# Patient Record
Sex: FEMALE | Race: WHITE | URBAN - METROPOLITAN AREA
[De-identification: names, ages, dates, MRNs, and addresses within clinical notes are randomized per-mention and may not be internally consistent; named-entity substitution may affect disease eponyms.]

---

## 2017-08-22 ENCOUNTER — HOSPITAL ENCOUNTER (EMERGENCY)
Facility: CLINIC | Age: 37
Discharge: HOME | End: 2017-08-23
Attending: EMERGENCY MEDICINE | Admitting: EMERGENCY MEDICINE
Payer: COMMERCIAL

## 2017-08-22 ENCOUNTER — APPOINTMENT (OUTPATIENT)
Dept: CT IMAGING | Facility: CLINIC | Age: 37
End: 2017-08-22
Attending: EMERGENCY MEDICINE
Payer: COMMERCIAL

## 2017-08-22 DIAGNOSIS — Z98.84 BARIATRIC SURGERY STATUS: ICD-10-CM

## 2017-08-22 DIAGNOSIS — R10.13 ABDOMINAL PAIN, EPIGASTRIC: ICD-10-CM

## 2017-08-22 DIAGNOSIS — R11.2 NON-INTRACTABLE VOMITING WITH NAUSEA, UNSPECIFIED VOMITING TYPE: ICD-10-CM

## 2017-08-22 LAB
ALBUMIN SERPL-MCNC: 4 G/DL (ref 3.4–5)
ALBUMIN UR-MCNC: NEGATIVE MG/DL
ALP SERPL-CCNC: 27 U/L (ref 40–150)
ALT SERPL W P-5'-P-CCNC: 18 U/L (ref 0–50)
AMPHETAMINES UR QL SCN: POSITIVE
ANION GAP SERPL CALCULATED.3IONS-SCNC: 6 MMOL/L (ref 3–14)
APPEARANCE UR: CLEAR
AST SERPL W P-5'-P-CCNC: 11 U/L (ref 0–45)
BARBITURATES UR QL: NEGATIVE
BASOPHILS # BLD AUTO: 0 10E9/L (ref 0–0.2)
BASOPHILS NFR BLD AUTO: 0.2 %
BENZODIAZ UR QL: NEGATIVE
BILIRUB SERPL-MCNC: 0.3 MG/DL (ref 0.2–1.3)
BILIRUB UR QL STRIP: NEGATIVE
BUN SERPL-MCNC: 12 MG/DL (ref 7–30)
CALCIUM SERPL-MCNC: 9 MG/DL (ref 8.5–10.1)
CANNABINOIDS UR QL SCN: NEGATIVE
CHLORIDE SERPL-SCNC: 110 MMOL/L (ref 94–109)
CO2 SERPL-SCNC: 27 MMOL/L (ref 20–32)
COCAINE UR QL: NEGATIVE
COLOR UR AUTO: ABNORMAL
CREAT SERPL-MCNC: 0.65 MG/DL (ref 0.52–1.04)
DIFFERENTIAL METHOD BLD: NORMAL
EOSINOPHIL # BLD AUTO: 0.1 10E9/L (ref 0–0.7)
EOSINOPHIL NFR BLD AUTO: 1.7 %
ERYTHROCYTE [DISTWIDTH] IN BLOOD BY AUTOMATED COUNT: 12.9 % (ref 10–15)
ETHANOL UR QL SCN: NEGATIVE
FERRITIN SERPL-MCNC: 32 NG/ML (ref 12–150)
GFR SERPL CREATININE-BSD FRML MDRD: >90 ML/MIN/1.7M2
GLUCOSE SERPL-MCNC: 82 MG/DL (ref 70–99)
GLUCOSE UR STRIP-MCNC: NEGATIVE MG/DL
HCG UR QL: NEGATIVE
HCT VFR BLD AUTO: 44.5 % (ref 35–47)
HGB BLD-MCNC: 14.8 G/DL (ref 11.7–15.7)
HGB UR QL STRIP: ABNORMAL
IMM GRANULOCYTES # BLD: 0 10E9/L (ref 0–0.4)
IMM GRANULOCYTES NFR BLD: 0.1 %
INR PPP: 1.03 (ref 0.86–1.14)
IRON SATN MFR SERPL: 26 % (ref 15–46)
IRON SERPL-MCNC: 96 UG/DL (ref 35–180)
KETONES UR STRIP-MCNC: NEGATIVE MG/DL
LEUKOCYTE ESTERASE UR QL STRIP: NEGATIVE
LIPASE SERPL-CCNC: 98 U/L (ref 73–393)
LYMPHOCYTES # BLD AUTO: 3.5 10E9/L (ref 0.8–5.3)
LYMPHOCYTES NFR BLD AUTO: 41.5 %
MAGNESIUM SERPL-MCNC: 2.2 MG/DL (ref 1.6–2.3)
MCH RBC QN AUTO: 29.6 PG (ref 26.5–33)
MCHC RBC AUTO-ENTMCNC: 33.3 G/DL (ref 31.5–36.5)
MCV RBC AUTO: 89 FL (ref 78–100)
MONOCYTES # BLD AUTO: 0.5 10E9/L (ref 0–1.3)
MONOCYTES NFR BLD AUTO: 5.8 %
MUCOUS THREADS #/AREA URNS LPF: PRESENT /LPF
NEUTROPHILS # BLD AUTO: 4.2 10E9/L (ref 1.6–8.3)
NEUTROPHILS NFR BLD AUTO: 50.7 %
NITRATE UR QL: NEGATIVE
NRBC # BLD AUTO: 0 10*3/UL
NRBC BLD AUTO-RTO: 0 /100
OPIATES UR QL SCN: NEGATIVE
PH UR STRIP: 7.5 PH (ref 5–7)
PLATELET # BLD AUTO: 264 10E9/L (ref 150–450)
POTASSIUM SERPL-SCNC: 4.3 MMOL/L (ref 3.4–5.3)
PROT SERPL-MCNC: 7.2 G/DL (ref 6.8–8.8)
RBC # BLD AUTO: 5 10E12/L (ref 3.8–5.2)
RBC #/AREA URNS AUTO: <1 /HPF (ref 0–2)
SODIUM SERPL-SCNC: 143 MMOL/L (ref 133–144)
SOURCE: ABNORMAL
SP GR UR STRIP: 1.01 (ref 1–1.03)
SQUAMOUS #/AREA URNS AUTO: 3 /HPF (ref 0–1)
TIBC SERPL-MCNC: 362 UG/DL (ref 240–430)
TSH SERPL DL<=0.005 MIU/L-ACNC: 0.86 MU/L (ref 0.4–4)
UROBILINOGEN UR STRIP-MCNC: NORMAL MG/DL (ref 0–2)
WBC # BLD AUTO: 8.3 10E9/L (ref 4–11)
WBC #/AREA URNS AUTO: <1 /HPF (ref 0–2)

## 2017-08-22 PROCEDURE — 96361 HYDRATE IV INFUSION ADD-ON: CPT | Performed by: EMERGENCY MEDICINE

## 2017-08-22 PROCEDURE — 85610 PROTHROMBIN TIME: CPT | Performed by: EMERGENCY MEDICINE

## 2017-08-22 PROCEDURE — 25000125 ZZHC RX 250: Performed by: RADIOLOGY

## 2017-08-22 PROCEDURE — 25000128 H RX IP 250 OP 636: Performed by: RADIOLOGY

## 2017-08-22 PROCEDURE — 84443 ASSAY THYROID STIM HORMONE: CPT | Performed by: EMERGENCY MEDICINE

## 2017-08-22 PROCEDURE — 80053 COMPREHEN METABOLIC PANEL: CPT | Performed by: EMERGENCY MEDICINE

## 2017-08-22 PROCEDURE — 83735 ASSAY OF MAGNESIUM: CPT | Performed by: EMERGENCY MEDICINE

## 2017-08-22 PROCEDURE — 85025 COMPLETE CBC W/AUTO DIFF WBC: CPT | Performed by: EMERGENCY MEDICINE

## 2017-08-22 PROCEDURE — 81025 URINE PREGNANCY TEST: CPT | Performed by: EMERGENCY MEDICINE

## 2017-08-22 PROCEDURE — 81001 URINALYSIS AUTO W/SCOPE: CPT | Performed by: EMERGENCY MEDICINE

## 2017-08-22 PROCEDURE — 80307 DRUG TEST PRSMV CHEM ANLYZR: CPT | Performed by: EMERGENCY MEDICINE

## 2017-08-22 PROCEDURE — 99284 EMERGENCY DEPT VISIT MOD MDM: CPT | Mod: Z6 | Performed by: EMERGENCY MEDICINE

## 2017-08-22 PROCEDURE — 80320 DRUG SCREEN QUANTALCOHOLS: CPT | Performed by: EMERGENCY MEDICINE

## 2017-08-22 PROCEDURE — 83540 ASSAY OF IRON: CPT | Performed by: EMERGENCY MEDICINE

## 2017-08-22 PROCEDURE — 82728 ASSAY OF FERRITIN: CPT | Performed by: EMERGENCY MEDICINE

## 2017-08-22 PROCEDURE — 96360 HYDRATION IV INFUSION INIT: CPT | Performed by: EMERGENCY MEDICINE

## 2017-08-22 PROCEDURE — 83550 IRON BINDING TEST: CPT | Performed by: EMERGENCY MEDICINE

## 2017-08-22 PROCEDURE — 74177 CT ABD & PELVIS W/CONTRAST: CPT

## 2017-08-22 PROCEDURE — 83690 ASSAY OF LIPASE: CPT | Performed by: EMERGENCY MEDICINE

## 2017-08-22 PROCEDURE — 99284 EMERGENCY DEPT VISIT MOD MDM: CPT | Mod: 25 | Performed by: EMERGENCY MEDICINE

## 2017-08-22 PROCEDURE — 25000128 H RX IP 250 OP 636: Performed by: EMERGENCY MEDICINE

## 2017-08-22 RX ORDER — SODIUM CHLORIDE 9 MG/ML
1000 INJECTION, SOLUTION INTRAVENOUS CONTINUOUS
Status: DISCONTINUED | OUTPATIENT
Start: 2017-08-22 | End: 2017-08-23 | Stop reason: HOSPADM

## 2017-08-22 RX ORDER — MULTIVITAMIN,THERAPEUTIC
1 TABLET ORAL DAILY
COMMUNITY

## 2017-08-22 RX ORDER — IOPAMIDOL 755 MG/ML
68 INJECTION, SOLUTION INTRAVASCULAR ONCE
Status: COMPLETED | OUTPATIENT
Start: 2017-08-22 | End: 2017-08-22

## 2017-08-22 RX ORDER — BUPRENORPHINE AND NALOXONE 4; 1 MG/1; MG/1
1 FILM, SOLUBLE BUCCAL; SUBLINGUAL DAILY
COMMUNITY

## 2017-08-22 RX ADMIN — IOPAMIDOL 68 ML: 755 INJECTION, SOLUTION INTRAVENOUS at 22:38

## 2017-08-22 RX ADMIN — SODIUM CHLORIDE 1000 ML: 900 INJECTION, SOLUTION INTRAVENOUS at 20:10

## 2017-08-22 RX ADMIN — SODIUM CHLORIDE, PRESERVATIVE FREE 67 ML: 5 INJECTION INTRAVENOUS at 22:38

## 2017-08-22 NOTE — ED AVS SNAPSHOT
[facility], [place], Emergency Department    [address] [place] [address]    Phone:  [phone]                                       Poly Choi   MRN: [id number]    Department:  [facility], Emergency Department   Date of Visit:  8/22/2017           After Visit Summary Signature Page     I have received my discharge instructions, and my questions have been answered. I have discussed any challenges I see with this plan with the nurse or doctor.    ..........................................................................................................................................  Patient/Patient Representative Signature      ..........................................................................................................................................  Patient Representative Print Name and Relationship to Patient    ..................................................               ................................................  Date                                            Time    ..........................................................................................................................................  Reviewed by Signature/Title    ...................................................              ..............................................  Date                                                            Time

## 2017-08-22 NOTE — ED NOTES
Pt with hx of gastric sleeve surgery 2 yrs ago presents with rapid weight loss of about 14lbs in 3 weeks, decreased appetite, nausea, dizziness and weakness. She was seen at [facility] ER a day or so ago for same symptoms.

## 2017-08-22 NOTE — ED AVS SNAPSHOT
[facility], Emergency Department    [address] [address]    Phone:  [phone]                                       Poly Choi   MRN: [id number]    Department:  [facility], Emergency Department   Date of Visit:  8/22/2017           Patient Information     Date Of Birth          1980        Your diagnoses for this visit were:     Non-intractable vomiting with nausea, unspecified vomiting type        You were seen by Boyd Parisi MD.        Discharge Instructions       Please make an appointment to follow up with Your Primary Care Provider in the next week for recheck.    Please follow up with Surgery (Bariatric) (phone: [phone]) as needed for continued problems.    Discharge References/Attachments     VOMITING (6Y-ADULT) (ENGLISH)      24 Hour Appointment Hotline       To make an appointment at any [facility] clinic, call [phone] ([phone]). If you don't have a family doctor or clinic, we will help you find one. [facility] clinics are conveniently located to serve the needs of you and your family.             Review of your medicines      START taking        Dose / Directions Last dose taken    lactulose 20 GM/30ML Soln   Dose:  30 mL   Quantity:  450 mL        Take 30 mLs by mouth 3 times daily for 5 days   Refills:  0          CONTINUE these medicines which may have CHANGED, or have new prescriptions. If we are uncertain of the size of tablets/capsules you have at home, strength may be listed as something that might have changed.        Dose / Directions Last dose taken    ondansetron 4 MG ODT tab   Commonly known as:  ZOFRAN ODT   Dose:  4 mg   What changed:  reasons to take this   Quantity:  10 tablet        Take 1 tablet (4 mg) by mouth every 6 hours as needed   Refills:  0          Our records show that you are taking the medicines listed below. If these are incorrect, please call your family doctor or clinic.        Dose / Directions Last dose  taken    buprenorphine HCl-naloxone HCl 4-1 MG per film   Commonly known as:  SUBOXONE   Dose:  1 Film        Place 1 Film under the tongue daily   Refills:  0        BUSPIRONE HCL PO   Dose:  30 mg        Take 30 mg by mouth 2 times daily   Refills:  0        LEVOTHYROXINE SODIUM PO   Dose:  25 mcg        Take 25 mcg by mouth daily   Refills:  0        multivitamin, therapeutic Tabs tablet   Dose:  1 tablet        Take 1 tablet by mouth daily   Refills:  0        SPIRONOLACTONE PO   Dose:  50 mg        Take 50 mg by mouth daily   Refills:  0                Prescriptions were sent or printed at these locations (2 Prescriptions)                   Other Prescriptions                Printed at Department/Unit printer (2 of 2)         lactulose 20 GM/30ML SOLN               ondansetron (ZOFRAN ODT) 4 MG ODT tab                Procedures and tests performed during your visit     CBC with platelets differential    CT Abdomen Pelvis w Contrast    Comprehensive metabolic panel    Drug abuse screen 6 urine (tox)    Ferritin    HCG qualitative urine    INR    Iron and iron binding capacity    Lipase    Magnesium    Peripheral IV: Standard    Routine UA with microscopic    TSH with free T4 reflex      Orders Needing Specimen Collection     None      Pending Results     Date and Time Order Name Status Description    8/22/2017 2122 CT Abdomen Pelvis w Contrast Preliminary             Pending Culture Results     No orders found for last 3 day(s).            Pending Results Instructions     If you had any lab results that were not finalized at the time of your Discharge, you can call the ED Lab Result RN at [phone]. You will be contacted by this team for any positive Lab results or changes in treatment. The nurses are available 7 days a week from 10A to 6:30P.  You can leave a message 24 hours per day and they will return your call.        Thank you for choosing [facility]       Thank you for choosing [facility] for your care.  "Our goal is always to provide you with excellent care. Hearing back from our patients is one way we can continue to improve our services. Please take a few minutes to complete the written survey that you may receive in the mail after you visit with us. Thank you!        Floq Information     Floq lets you send messages to your doctor, view your test results, renew your prescriptions, schedule appointments and more. To sign up, go to [email or web address] . Click on \"Log in\" on the left side of the screen, which will take you to the Welcome page. Then click on \"Sign up Now\" on the right side of the page.     You will be asked to enter the access code listed below, as well as some personal information. Please follow the directions to create your username and password.     Your access code is: [email or web address]  Expires: 2017 12:24 AM     Your access code will  in 90 days. If you need help or a new code, please call your [facility] clinic or [phone].        Care EveryWhere ID     This is your Care EveryWhere ID. This could be used by other organizations to access your [facility] medical records  [id number]        Equal Access to Services     EDIS ABBASI AH: Hadii charles Lebron, wadivya matute, qanewton kaalmada sandra, pee gaviria. So [facility] [phone].    ATENCIÓN: Si habla español, tiene a suh disposición servicios gratuitos de asistencia lingüística. Llame al [phone].    We comply with applicable federal civil rights laws and [place] laws. We do not discriminate on the basis of race, color, national origin, age, disability sex, sexual orientation or gender identity.            After Visit Summary       This is your record. Keep this with you and show to your community pharmacist(s) and doctor(s) at your next visit.                  "

## 2017-08-23 VITALS
TEMPERATURE: 98.2 F | DIASTOLIC BLOOD PRESSURE: 50 MMHG | HEART RATE: 88 BPM | HEIGHT: 64 IN | SYSTOLIC BLOOD PRESSURE: 91 MMHG | BODY MASS INDEX: 18.95 KG/M2 | WEIGHT: 111 LBS | RESPIRATION RATE: 17 BRPM | OXYGEN SATURATION: 100 %

## 2017-08-23 RX ORDER — ONDANSETRON 4 MG/1
4 TABLET, ORALLY DISINTEGRATING ORAL EVERY 6 HOURS PRN
Qty: 10 TABLET | Refills: 0 | Status: SHIPPED | OUTPATIENT
Start: 2017-08-23 | End: 2017-08-26

## 2017-08-23 RX ORDER — LACTULOSE 20 G/30ML
30 SOLUTION ORAL 3 TIMES DAILY
Qty: 450 ML | Refills: 0 | Status: SHIPPED | OUTPATIENT
Start: 2017-08-23 | End: 2017-08-28

## 2017-08-23 NOTE — ED PROVIDER NOTES
History     Chief Complaint   Patient presents with     Weight Loss     x 3 weeks     Dizziness     Generalized Weakness     HPI  Poly Choi is a 37-year-old female who presents to emergency department with complaints of a 15 pound weight loss over the last 3-4 weeks. Patient states that she had gastric bypass surgery done in [place] in 2015. Patient states that recently she s noticed that she has been losing weight and states that she has been increasing her calorie input in order to gain weight. Patient denies any diarrhea, but states she has had some occasional emesis with upper abdominal distress.  Patient states she was seen at [facility], 3 days ago where she had an evaluation done with laboratory testing and abdominal x-ray imaging. Patient was sent home with instructions for constipation after a pretty much negative evaluation. The patient states that she s had no coughing, no fevers, no UTI symptoms and states that she is on Suboxone chronically.  Patient presents here to the ER for evaluation.      This part of the document was transcribed by James Gunter, Medical Scribe.    I have reviewed the Medications, Allergies, Past Medical and Surgical History, and Social History in the [facility] system.    Past Medical History:   Diagnosis Date     Allergic state      Anxiety        No current facility-administered medications on file prior to encounter.   No current outpatient prescriptions on file prior to encounter.  Allergies   Allergen Reactions     Epinephrine Other (See Comments)     Social History     Social History     Marital status:      Spouse name: N/A     Number of children: N/A     Years of education: N/A     Occupational History     Not on file.     Social History Main Topics     Smoking status: Current Every Day Smoker     Packs/day: 0.25     Smokeless tobacco: Never Used     Alcohol use No     Drug use: No     Sexual activity: Not on file     Other Topics Concern     Not on file  "    Social History Narrative     No narrative on file     Past Surgical History:   Procedure Laterality Date     CHOLECYSTECTOMY       COLONOSCOPY       DAVINCI GASTRIC SLEEVE       ENT SURGERY       GYN SURGERY       HERNIA REPAIR       ORTHOPEDIC SURGERY       No family history on file.    Review of Systems   All other systems reviewed and are negative.      Physical Exam   BP: 99/57  Pulse: 88  Temp: 98.2  F (36.8  C)  Resp: 16  Height: 161.9 cm (5' 3.75\")  Weight: 50.3 kg (111 lb)  SpO2: 100 %  Physical Exam   Constitutional: She is oriented to person, place, and time.   Alert and conversant and pleasant   HENT:   Head: Atraumatic.   Eyes: EOM are normal. Pupils are equal, round, and reactive to light.   Neck: Neck supple.   Cardiovascular: Normal heart sounds.    Pulmonary/Chest: Breath sounds normal.   Abdominal: Soft. There is tenderness (mild epigastric). There is no rebound and no guarding.   Increased bowel sounds   Musculoskeletal: She exhibits no edema or tenderness.   Neurological: She is alert and oriented to person, place, and time.   Grossly intact and symmetric   Skin: Skin is warm.   Psychiatric: She has a normal mood and affect.       ED Course     ED Course     Procedures        Results for orders placed or performed during the hospital encounter of 08/22/17   CT Abdomen Pelvis w Contrast    Narrative    1. Postoperative changes of sleeve gastrectomy without any acute  findings in the abdomen.  2. Cholecystectomy mild intrahepatic and extra hepatic biliary ductal  dilatation at, likely within normal postoperative limits.  3. Redundant and stool-filled colon.  4. Small hiatal hernia.     Routine UA with microscopic   Result Value Ref Range    Color Urine Light Yellow     Appearance Urine Clear     Glucose Urine Negative NEG^Negative mg/dL    Bilirubin Urine Negative NEG^Negative    Ketones Urine Negative NEG^Negative mg/dL    Specific Gravity Urine 1.008 1.003 - 1.035    Blood Urine Trace (A) " NEG^Negative    pH Urine 7.5 (H) 5.0 - 7.0 pH    Protein Albumin Urine Negative NEG^Negative mg/dL    Urobilinogen mg/dL Normal 0.0 - 2.0 mg/dL    Nitrite Urine Negative NEG^Negative    Leukocyte Esterase Urine Negative NEG^Negative    Source Midstream Urine     WBC Urine <1 0 - 2 /HPF    RBC Urine <1 0 - 2 /HPF    Squamous Epithelial /HPF Urine 3 (H) 0 - 1 /HPF    Mucous Urine Present (A) NEG^Negative /LPF   Comprehensive metabolic panel   Result Value Ref Range    Sodium 143 133 - 144 mmol/L    Potassium 4.3 3.4 - 5.3 mmol/L    Chloride 110 (H) 94 - 109 mmol/L    Carbon Dioxide 27 20 - 32 mmol/L    Anion Gap 6 3 - 14 mmol/L    Glucose 82 70 - 99 mg/dL    Urea Nitrogen 12 7 - 30 mg/dL    Creatinine 0.65 0.52 - 1.04 mg/dL    GFR Estimate >90 >60 mL/min/1.7m2    GFR Estimate If Black >90 >60 mL/min/1.7m2    Calcium 9.0 8.5 - 10.1 mg/dL    Bilirubin Total 0.3 0.2 - 1.3 mg/dL    Albumin 4.0 3.4 - 5.0 g/dL    Protein Total 7.2 6.8 - 8.8 g/dL    Alkaline Phosphatase 27 (L) 40 - 150 U/L    ALT 18 0 - 50 U/L    AST 11 0 - 45 U/L   Lipase   Result Value Ref Range    Lipase 98 73 - 393 U/L   CBC with platelets differential   Result Value Ref Range    WBC 8.3 4.0 - 11.0 10e9/L    RBC Count 5.00 3.8 - 5.2 10e12/L    Hemoglobin 14.8 11.7 - 15.7 g/dL    Hematocrit 44.5 35.0 - 47.0 %    MCV 89 78 - 100 fl    MCH 29.6 26.5 - 33.0 pg    MCHC 33.3 31.5 - 36.5 g/dL    RDW 12.9 10.0 - 15.0 %    Platelet Count 264 150 - 450 10e9/L    Diff Method Automated Method     % Neutrophils 50.7 %    % Lymphocytes 41.5 %    % Monocytes 5.8 %    % Eosinophils 1.7 %    % Basophils 0.2 %    % Immature Granulocytes 0.1 %    Nucleated RBCs 0 0 /100    Absolute Neutrophil 4.2 1.6 - 8.3 10e9/L    Absolute Lymphocytes 3.5 0.8 - 5.3 10e9/L    Absolute Monocytes 0.5 0.0 - 1.3 10e9/L    Absolute Eosinophils 0.1 0.0 - 0.7 10e9/L    Absolute Basophils 0.0 0.0 - 0.2 10e9/L    Abs Immature Granulocytes 0.0 0 - 0.4 10e9/L    Absolute Nucleated RBC 0.0    INR    Result Value Ref Range    INR 1.03 0.86 - 1.14   HCG qualitative urine   Result Value Ref Range    HCG Qual Urine Negative NEG^Negative   Ferritin   Result Value Ref Range    Ferritin 32 12 - 150 ng/mL   Iron and iron binding capacity   Result Value Ref Range    Iron 96 35 - 180 ug/dL    Iron Binding Cap 362 240 - 430 ug/dL    Iron Saturation Index 26 15 - 46 %   Magnesium   Result Value Ref Range    Magnesium 2.2 1.6 - 2.3 mg/dL   Drug abuse screen 6 urine (tox)   Result Value Ref Range    Amphetamine Qual Urine Positive (A) NEG^Negative    Barbiturates Qual Urine Negative NEG^Negative    Benzodiazepine Qual Urine Negative NEG^Negative    Cannabinoids Qual Urine Negative NEG^Negative    Cocaine Qual Urine Negative NEG^Negative    Ethanol Qual Urine Negative NEG^Negative    Opiates Qualitative Urine Negative NEG^Negative   TSH with free T4 reflex   Result Value Ref Range    TSH 0.86 0.40 - 4.00 mU/L       Labs Ordered and Resulted from Time of ED Arrival Up to the Time of Departure from the ED   ROUTINE UA WITH MICROSCOPIC - Abnormal; Notable for the following:        Result Value    Blood Urine Trace (*)     pH Urine 7.5 (*)     Squamous Epithelial /HPF Urine 3 (*)     Mucous Urine Present (*)     All other components within normal limits   COMPREHENSIVE METABOLIC PANEL - Abnormal; Notable for the following:     Chloride 110 (*)     Alkaline Phosphatase 27 (*)     All other components within normal limits   DRUG ABUSE SCREEN 6 CHEM DEP URINE ([facility]) - Abnormal; Notable for the following:     Amphetamine Qual Urine Positive (*)     All other components within normal limits   LIPASE   CBC WITH PLATELETS DIFFERENTIAL   INR   HCG QUALITATIVE URINE   FERRITIN   IRON AND IRON BINDING CAPACITY   MAGNESIUM   TSH WITH FREE T4 REFLEX   PERIPHERAL IV CATHETER         Assessments & Plan (with Medical Decision Making)     I have reviewed the nursing notes.    Medications   0.9% sodium chloride BOLUS (0 mLs Intravenous Stopped  8/22/17 2201)     Followed by   0.9% sodium chloride infusion (not administered)   sodium chloride 0.9 % for CT scan flush dose 67 mL (67 mLs As instructed Given 8/22/17 2238)   iohexol (OMNIPAQUE) solution 25 mL (25 mLs Oral Given 8/22/17 2213)   iopamidol (ISOVUE-370) solution 68 mL (68 mLs Intravenous Given 8/22/17 2238)     Patient's workup was relatively unremarkable except for a large stool burden on her CT and amphetamine in her urine.  Patient at this time will be sent home.    I have reviewed the findings, diagnosis, plan and need for follow up with the patient.    New Prescriptions    LACTULOSE 20 GM/30ML SOLN    Take 30 mLs by mouth 3 times daily for 5 days    ONDANSETRON (ZOFRAN ODT) 4 MG ODT TAB    Take 1 tablet (4 mg) by mouth every 6 hours as needed       Final diagnoses:   Non-intractable vomiting with nausea, unspecified vomiting type     Please make an appointment to follow up with Your Primary Care Provider in the next week for recheck.    Please follow up with Surgery (Bariatric) (phone: [phone]) as needed for continued problems.    Routine discharge instructions were given for this diagnosis    Boyd Parisi MD    8/22/2017   [facility], [place], EMERGENCY DEPARTMENT     Boyd Parisi MD  08/23/17 0027

## 2018-01-24 ENCOUNTER — OFFICE VISIT (OUTPATIENT)
Dept: URGENT CARE | Facility: URGENT CARE | Age: 38
End: 2018-01-24
Payer: MEDICAID

## 2018-01-24 VITALS
WEIGHT: 112 LBS | TEMPERATURE: 99 F | OXYGEN SATURATION: 97 % | RESPIRATION RATE: 16 BRPM | HEART RATE: 86 BPM | DIASTOLIC BLOOD PRESSURE: 62 MMHG | SYSTOLIC BLOOD PRESSURE: 120 MMHG

## 2018-01-24 DIAGNOSIS — N94.9 VAGINAL SYMPTOM: Primary | ICD-10-CM

## 2018-01-24 DIAGNOSIS — R10.2 PELVIC PAIN IN FEMALE: ICD-10-CM

## 2018-01-24 PROBLEM — R41.840 ATTENTION DEFICIT: Status: ACTIVE | Noted: 2017-01-02

## 2018-01-24 PROBLEM — E03.9 HYPOTHYROIDISM: Chronic | Status: ACTIVE | Noted: 2018-01-24

## 2018-01-24 LAB
ALBUMIN UR-MCNC: NEGATIVE MG/DL
APPEARANCE UR: CLEAR
BETA HCG QUAL IFA URINE: NEGATIVE
BILIRUB UR QL STRIP: NEGATIVE
COLOR UR AUTO: YELLOW
GLUCOSE UR STRIP-MCNC: NEGATIVE MG/DL
HGB UR QL STRIP: NEGATIVE
KETONES UR STRIP-MCNC: NEGATIVE MG/DL
LEUKOCYTE ESTERASE UR QL STRIP: NEGATIVE
NITRATE UR QL: NEGATIVE
PH UR STRIP: 5.5 PH (ref 5–7)
SOURCE: NORMAL
SP GR UR STRIP: >1.03 (ref 1–1.03)
SPECIMEN SOURCE: NORMAL
UROBILINOGEN UR STRIP-ACNC: 0.2 EU/DL (ref 0.2–1)
WET PREP SPEC: NORMAL

## 2018-01-24 PROCEDURE — 87591 N.GONORRHOEAE DNA AMP PROB: CPT | Performed by: FAMILY MEDICINE

## 2018-01-24 PROCEDURE — 87491 CHLMYD TRACH DNA AMP PROBE: CPT | Performed by: FAMILY MEDICINE

## 2018-01-24 PROCEDURE — 87210 SMEAR WET MOUNT SALINE/INK: CPT | Performed by: FAMILY MEDICINE

## 2018-01-24 PROCEDURE — 99203 OFFICE O/P NEW LOW 30 MIN: CPT | Performed by: FAMILY MEDICINE

## 2018-01-24 PROCEDURE — 84703 CHORIONIC GONADOTROPIN ASSAY: CPT | Performed by: FAMILY MEDICINE

## 2018-01-24 PROCEDURE — 81003 URINALYSIS AUTO W/O SCOPE: CPT | Performed by: FAMILY MEDICINE

## 2018-01-24 RX ORDER — ESCITALOPRAM OXALATE 10 MG/1
10 TABLET ORAL
COMMUNITY
Start: 2017-12-08

## 2018-01-24 RX ORDER — ONDANSETRON 4 MG/1
4-8 TABLET, ORALLY DISINTEGRATING ORAL EVERY 8 HOURS PRN
Qty: 20 TABLET | Refills: 1 | COMMUNITY
Start: 2018-01-24

## 2018-01-24 RX ORDER — DEXTROAMPHETAMINE SACCHARATE, AMPHETAMINE ASPARTATE, DEXTROAMPHETAMINE SULFATE AND AMPHETAMINE SULFATE 2.5; 2.5; 2.5; 2.5 MG/1; MG/1; MG/1; MG/1
10 TABLET ORAL
COMMUNITY
Start: 2018-01-12 | End: 2018-02-11

## 2018-01-24 RX ORDER — BUPRENORPHINE AND NALOXONE 2; .5 MG/1; MG/1
1 FILM, SOLUBLE BUCCAL; SUBLINGUAL DAILY
COMMUNITY
Start: 2018-01-24

## 2018-01-24 RX ORDER — LEVOTHYROXINE SODIUM 25 UG/1
TABLET ORAL
COMMUNITY
Start: 2017-07-17

## 2018-01-24 NOTE — MR AVS SNAPSHOT
After Visit Summary   1/24/2018    Poly Obando    MRN: [id number]           Patient Information     Date Of Birth          1980        Visit Information        Provider Department      1/24/2018 7:30 PM Sarita Daniels MD [facility]        Today's Diagnoses     Vaginal symptom    -  1    Pelvic pain in female          Care Instructions    Please call the Imaging Center to schedule an appointment for your imaging. [phone]    Pelvic Pain, Uncertain Cause    Pelvic pain is pain felt in the lowest part of the belly (abdomen) and between the hipbones. The pain may be acute. This means it occurred suddenly and recently. Or the pain may be chronic. This means it has occurred for 6 months or longer.  There are many possible causes of pelvic pain. The pain may be due to a problem in the female reproductive system (pictured here). Or, it may be due to a problem in the digestive, urinary, or musculoskeletal systems.  Based on your visit today, the exact cause of your pelvic pain is not certain. Your condition does not appear to be serious at this time. But it is important for you to keep watching for any new symptoms or worsening of your condition.  General care  Your healthcare provider may advise a number of ways to help manage your pain. These can include:    Taking over-the-counter pain medicine. Stronger pain medicine may also be prescribed, if needed.    Applying heat to the pelvic area. Use a heating pad or a hot pack. Taking a hot bath may also help.    Getting plenty of rest.    Making certain lifestyle changes. These can include practicing good posture and getting regular exercise. (Studies have shown that these changes help reduce pelvic pain in some women.)    Seeing a physical therapist or pain specialist. These healthcare providers can discuss other ways to manage pain with you.  Follow-up care  Follow up with your healthcare provider, or as advised.   When to seek  "medical advice  Call your healthcare provider right away if any of the following occur:    Fever of 100.4 F or higher, or as directed by your healthcare provider    Pain worsens or you have sudden, severe pain or new pain    Nausea, vomiting, sweating, or restlessness    Dizziness or fainting    Unusual vaginal discharge    Abnormal vaginal bleeding (especially bleeding after menopause)  Date Last Reviewed: 6/11/2015 2000-2017 [employer]. [address] [place] [address]. All rights reserved. This information is not intended as a substitute for professional medical care. Always follow your healthcare professional's instructions.                Follow-ups after your visit        Future tests that were ordered for you today     Open Future Orders        Priority Expected Expires Ordered    US Pelvic Complete w Transvaginal Routine  1/24/2019 1/24/2018            Who to contact     If you have questions or need follow up information about today's clinic visit or your schedule please contact [facility] directly at [phone].  Normal or non-critical lab and imaging results will be communicated to you by Bravoflyhart, letter or phone within 4 business days after the clinic has received the results. If you do not hear from us within 7 days, please contact the clinic through Bravoflyhart or phone. If you have a critical or abnormal lab result, we will notify you by phone as soon as possible.  Submit refill requests through [employer] or call your pharmacy and they will forward the refill request to us. Please allow 3 business days for your refill to be completed.          Additional Information About Your Visit        Bravoflyhart Information     [employer] lets you send messages to your doctor, view your test results, renew your prescriptions, schedule appointments and more. To sign up, go to [email or web address] . Click on \"Log in\" on the left side of the screen, which will take you to the " "Welcome page. Then click on \"Sign up Now\" on the right side of the page.     You will be asked to enter the access code listed below, as well as some personal information. Please follow the directions to create your username and password.     Your access code is: [email or web address]  Expires: 2018  9:08 PM     Your access code will  in 90 days. If you need help or a new code, please call your [facility] clinic or [phone].        Care EveryWhere ID     This is your Care EveryWhere ID. This could be used by other organizations to access your [facility] medical records  [id number]        Your Vitals Were     Pulse Temperature Respirations Pulse Oximetry Breastfeeding?       86 99  F (37.2  C) (Oral) 16 97% No        Blood Pressure from Last 3 Encounters:   18 120/62    Weight from Last 3 Encounters:   18 112 lb (50.8 kg)              We Performed the Following     *UA reflex to Microscopic and Culture ([facility] [facility] and [facility] (except [place] and [place])     Beta HCG qual IFA urine - [facility] and [facility]     Beta HCG qual IFA urine - [facility] and [place]     CHLAMYDIA TRACHOMATIS PCR     NEISSERIA GONORRHOEA PCR     Wet prep        Primary Care Provider Office Phone # Fax #    [facility] [phone] [address] [facility] [address]        Equal Access to Services     EDIS ABBASI : Hadii aad ku hadasho Soomaali, waaxda luqadaha, qaybta kaalmada adebahmanda, pee lindsay . So [facility] [phone].    ATENCIÓN: Si habla español, tiene a suh disposición servicios gratuitos de asistencia lingüística. Jessika al [phone].    We comply with applicable federal civil rights laws and [place] laws. We do not discriminate on the basis of race, color, national origin, age, disability, sex, sexual orientation, or gender identity.            Thank you!     Thank you for choosing [facility]  for your care. Our goal is always to " provide you with excellent care. Hearing back from our patients is one way we can continue to improve our services. Please take a few minutes to complete the written survey that you may receive in the mail after your visit with us. Thank you!             Your Updated Medication List - Protect others around you: Learn how to safely use, store and throw away your medicines at www.disposemymeds.org.          This list is accurate as of 1/24/18  9:08 PM.  Always use your most recent med list.                   Brand Name Dispense Instructions for use Diagnosis    amphetamine-dextroamphetamine 10 MG per tablet    ADDERALL     Take 10 mg by mouth        escitalopram 10 MG tablet    LEXAPRO     Take 10 mg by mouth        levothyroxine 25 MCG tablet    SYNTHROID/LEVOTHROID     TAKE ONE TABLET BY MOUTH ONCE DAILY        SUBOXONE 2-0.5 MG per film   Generic drug:  buprenorphine HCl-naloxone HCl      Place 1 Film under the tongue daily        ZOFRAN ODT 4 MG ODT tab   Generic drug:  ondansetron     20 tablet    Take 1-2 tablets (4-8 mg) by mouth every 8 hours as needed for nausea

## 2018-01-25 NOTE — PROGRESS NOTES
Cc: vaginal symptoms    Patient currently  from her   A month and a half ago unprotected sex with another person    Since then has been having some pelvic discomfort  Then she started having spotting just for a couple of days then 3 days later got her period.   After this the pelvic discomfort   No loss of control of bowel or bladder, no numbness or weakness down the legs  No thoughts of harming self or others     Also noticed some change in her vaginal discharge     Patient smokes    She feels safe at home. No thoughts of harming self or others. Denies any concern or history of domestic violence or spousal or partner abuse.     I had recommended complete panel  STD testing: YES and patient declined    Dysuria urgency or frequency or UTI symptoms: No     Problem list and histories reviewed & adjusted, as indicated.  Additional history: as documented    Problem list, Medication list, Allergies, and Medical/Social/Surgical histories reviewed in [facility] and updated as appropriate.    ROS:  Constitutional, HEENT, cardiovascular, pulmonary, gi and gu systems are negative, except as otherwise noted.    OBJECTIVE:                                                    /62  Pulse 86  Temp 99  F (37.2  C) (Oral)  Resp 16  Wt 112 lb (50.8 kg)  SpO2 97%  Breastfeeding? No  There is no height or weight on file to calculate BMI.  GENERAL: healthy, alert and no distress  EYES: Eyes grossly normal to inspection, conjunctivae and sclerae normal  NECK: no adenopathy, no asymmetry, masses, or scars and thyroid normal to palpation  RESP: lungs clear to auscultation - no rales, rhonchi or wheezes  CV: regular rate and rhythm, normal S1 S2, no S3 or S4, no murmur, click or rub, no peripheral edema and peripheral pulses strong  ABDOMEN: soft, nontender, no hepatosplenomegaly, no masses and bowel sounds normal   (female): female external genitalia appeared normal , normal urethral meatus, vaginal mucosa, normal  cervix/adnexa/uterus without masses, No cervical motion tenderness  Ovaries palpable bilaterally, some tenderness and fullness on left adnexal area   Vaginal discharge appeared: normal   MS: no gross musculoskeletal defects noted, no edema  SKIN: no suspicious lesions or rashes  NEURO: Normal strength and tone, mentation intact and speech normal  PSYCH: mentation appears normal, affect normal/bright no thoughts of harming self or others     Diagnostic Test Results:  Results for orders placed or performed in visit on 01/24/18 (from the past 24 hour(s))   Wet prep   Result Value Ref Range    Specimen Description Vagina     Wet Prep No yeast seen     Wet Prep No clue cells seen     Wet Prep No Trichomonas seen    *UA reflex to Microscopic and Culture ([facility] (except [place] and [place])   Result Value Ref Range    Color Urine Yellow     Appearance Urine Clear     Glucose Urine Negative NEG^Negative mg/dL    Bilirubin Urine Negative NEG^Negative    Ketones Urine Negative NEG^Negative mg/dL    Specific Gravity Urine >1.030 1.003 - 1.035    Blood Urine Negative NEG^Negative    pH Urine 5.5 5.0 - 7.0 pH    Protein Albumin Urine Negative NEG^Negative mg/dL    Urobilinogen Urine 0.2 0.2 - 1.0 EU/dL    Nitrite Urine Negative NEG^Negative    Leukocyte Esterase Urine Negative NEG^Negative    Source Midstream Urine    Beta HCG qual IFA urine - [facility] and [place]   Result Value Ref Range    Beta HCG Qual IFA Urine Negative NEG^Negative           ASSESSMENT/PLAN:                                                        ICD-10-CM    1. Vaginal symptom N94.9 Wet prep     *UA reflex to Microscopic and Culture ([facility] (except [place] and [place])     NEISSERIA GONORRHOEA PCR     CHLAMYDIA TRACHOMATIS PCR     Beta HCG qual IFA urine - [facility] and [place]     CANCELED: Beta HCG qual IFA urine - [facility] and [place]   2. Pelvic pain in female R10.2 US Pelvic Complete w Transvaginal      Patient did not want std testing to show up in her bill - inquired with lab and they stated it will only show lab request and not the specific test  She doesn't think it was a high risk exposure and admits she had been in a relationship with this other person (not her ) for 8 years   She declines any empiric treatment  Pending results. Also declined complete STD screening.   However she was worried as she is planning on reconciling with her .  Her pelvic pain as well may not be anything new she claims. She states she has been known to have pelvic pains here and there with history of fibromyalgia and is established with obgyn. She plans to follow up with obgyn.   With tenderness (very minimal) and fullness of left adnexa, recommend ultrasound. She will schedule.   Alarm signs or symptoms discussed, if present recommend go to ER   See patient instructions.  Aware to come back in if with any worsening symptoms, bowel or bladder incontinence, motor or senosry deficits.   Recommend follow up with primary care provider if no relief in 3 days, sooner if worse  Adverse reactions of medications discussed.  Over the counter medications discussed.   Aware to come back in if with worsening symptoms or if no relief despite treatment plan  Patient voiced understanding and had no further questions.   Reinforced safe sex practices and use of protection. recommended STD screening as it is encouraged by the CDC as well. However, if with any high risk sexual encounter, recommended to be screened for STD's at 6 weeks, 12 weeks, 6 months, and a year after the encounter as some of these tests do not show positive immediately. If any of these tests turn back positive, informed that patient will need to inform all sexual contacts of possibility of infection.       Sarita Daniels MD    See Patient Instructions    Sarita Daniels MD  [facility]

## 2018-01-25 NOTE — PATIENT INSTRUCTIONS
Please call the Imaging Center to schedule an appointment for your imaging. [phone]    Pelvic Pain, Uncertain Cause    Pelvic pain is pain felt in the lowest part of the belly (abdomen) and between the hipbones. The pain may be acute. This means it occurred suddenly and recently. Or the pain may be chronic. This means it has occurred for 6 months or longer.  There are many possible causes of pelvic pain. The pain may be due to a problem in the female reproductive system (pictured here). Or, it may be due to a problem in the digestive, urinary, or musculoskeletal systems.  Based on your visit today, the exact cause of your pelvic pain is not certain. Your condition does not appear to be serious at this time. But it is important for you to keep watching for any new symptoms or worsening of your condition.  General care  Your healthcare provider may advise a number of ways to help manage your pain. These can include:    Taking over-the-counter pain medicine. Stronger pain medicine may also be prescribed, if needed.    Applying heat to the pelvic area. Use a heating pad or a hot pack. Taking a hot bath may also help.    Getting plenty of rest.    Making certain lifestyle changes. These can include practicing good posture and getting regular exercise. (Studies have shown that these changes help reduce pelvic pain in some women.)    Seeing a physical therapist or pain specialist. These healthcare providers can discuss other ways to manage pain with you.  Follow-up care  Follow up with your healthcare provider, or as advised.   When to seek medical advice  Call your healthcare provider right away if any of the following occur:    Fever of 100.4 F or higher, or as directed by your healthcare provider    Pain worsens or you have sudden, severe pain or new pain    Nausea, vomiting, sweating, or restlessness    Dizziness or fainting    Unusual vaginal discharge    Abnormal vaginal bleeding (especially bleeding after  menopause)  Date Last Reviewed: 6/11/2015 2000-2017 [employer]. [address] [place] [address], [place] [address]. All rights reserved. This information is not intended as a substitute for professional medical care. Always follow your healthcare professional's instructions.

## 2018-01-25 NOTE — NURSING NOTE
Chief Complaint   Patient presents with     Vaginal Problem     vag discharge, back and abdominal pain       Initial /62  Pulse 86  Temp 99  F (37.2  C) (Oral)  Resp 16  Wt 112 lb (50.8 kg)  SpO2 97%  Breastfeeding? No There is no height or weight on file to calculate BMI.  Medication Reconciliation: complete  Patient and/or MA was masked during rooming process   Vincenzo Javed MA

## 2018-01-26 ENCOUNTER — RADIANT APPOINTMENT (OUTPATIENT)
Dept: ULTRASOUND IMAGING | Facility: CLINIC | Age: 38
End: 2018-01-26
Attending: FAMILY MEDICINE
Payer: MEDICAID

## 2018-01-26 DIAGNOSIS — R10.2 PELVIC PAIN IN FEMALE: ICD-10-CM

## 2018-01-26 LAB
C TRACH DNA SPEC QL NAA+PROBE: NEGATIVE
N GONORRHOEA DNA SPEC QL NAA+PROBE: NEGATIVE
SPECIMEN SOURCE: NORMAL
SPECIMEN SOURCE: NORMAL

## 2018-01-26 PROCEDURE — 76856 US EXAM PELVIC COMPLETE: CPT

## 2018-01-26 PROCEDURE — 76830 TRANSVAGINAL US NON-OB: CPT

## 2018-01-29 ENCOUNTER — TELEPHONE (OUTPATIENT)
Dept: URGENT CARE | Facility: URGENT CARE | Age: 38
End: 2018-01-29

## 2018-01-29 NOTE — TELEPHONE ENCOUNTER
Left message on answering machine for patient/parent to call back.   [phone].  If calling tomorrow [phone].   Brook Hu RN       Notes Recorded by Sarita Daniels MD on 1/26/2018 at 2:06 PM  Please inform ultrasound is normal. Small amount of free fluid noted in pelvis uncertain importance. I would recommend that she follow up with her obgyn to discuss further.  If worsening or severe symptoms go to ER. Sarita Daniels M.D.

## 2018-01-29 NOTE — TELEPHONE ENCOUNTER
Reason for Call:  Request for results:    Name of test or procedure: Ultrasounds    Date of test of procedure: 01/26/18     Location of the test or procedure: [facility]     OK to leave the result message on voice mail or with a family member? YES    Phone number Patient can be reached at:  Home number on file [phone] (home)    Additional comments:     Call taken on 1/29/2018 at 4:54 PM by Michelle Zarate

## 2018-01-30 NOTE — TELEPHONE ENCOUNTER
Patient/parent is informed of MD notes below, as it is written. Verbalized good understanding.    Patient expressed frustration that  had been called and left message, when she has specifically requested his number not be used.  Currently going through a separation, did not want the person to be aware of what she was doing.  Spouses number is listed in call back below and was listed in home number # [phone].    Advise patient will have supervisor contact patient tomorrow.   Again apologized.     Verbalized good understanding.     Brook Hu RN

## 2018-01-30 NOTE — TELEPHONE ENCOUNTER
Notes Recorded by Sarita Daniels MD on 1/26/2018 at 2:02 PM    Please inform negative gonorrhea and chlamydia.     Please DO NOT MAIL RESULTS AND DO NOT LEAVE DETAILED MESSAGE.     Patient would like this information as confidential as possible.   Thank you. Sarita Daniels M.D.